# Patient Record
Sex: FEMALE | Race: WHITE | NOT HISPANIC OR LATINO | Employment: OTHER | URBAN - METROPOLITAN AREA
[De-identification: names, ages, dates, MRNs, and addresses within clinical notes are randomized per-mention and may not be internally consistent; named-entity substitution may affect disease eponyms.]

---

## 2023-05-17 ENCOUNTER — OFFICE VISIT (OUTPATIENT)
Dept: PRIMARY CARE | Facility: CLINIC | Age: 88
End: 2023-05-17
Payer: COMMERCIAL

## 2023-05-17 VITALS — DIASTOLIC BLOOD PRESSURE: 76 MMHG | SYSTOLIC BLOOD PRESSURE: 125 MMHG

## 2023-05-17 DIAGNOSIS — L57.0 ACTINIC KERATOSIS OF LEFT CHEEK: ICD-10-CM

## 2023-05-17 DIAGNOSIS — M25.551 PAIN OF RIGHT HIP: Primary | ICD-10-CM

## 2023-05-17 PROCEDURE — 99214 OFFICE O/P EST MOD 30 MIN: CPT | Performed by: FAMILY MEDICINE

## 2023-05-17 PROCEDURE — 17000 DESTRUCT PREMALG LESION: CPT | Performed by: FAMILY MEDICINE

## 2023-05-17 RX ORDER — GABAPENTIN 300 MG/1
300 CAPSULE ORAL NIGHTLY
Qty: 30 CAPSULE | Refills: 2 | Status: SHIPPED | OUTPATIENT
Start: 2023-05-17 | End: 2023-08-08 | Stop reason: SDUPTHER

## 2023-05-17 NOTE — PROGRESS NOTES
Subjective   Patient ID: Alisha Hodge is a 88 y.o. female who presents for left face skin lesion and rt hip pain    HPI   Patient noticed a brownish scaly skin rash left face a few months ago. No bleeding or oozing.  The area became sensitive to touch lately. No similar rashes elsewhere. No local gland enlargement. Rt hip pain persisted and was not controlled tylenol or neurontin 100-200mg at bedtime. Walking made the pain worse  Review of Systems    Objective   /76     Physical Exam  NAD, well groomed, Eyes: No sclera icterus. Neck: supple, no cervical lymphadenopathy, lungs: CTA b/l, heart: RRR, abd: soft, no tenderness, BS+, Good balance. There was a 4x4mm brownish scaly round rash at left face. No tenderness or bleeding.   Mild tenderness at rt hip with decreased rom. Good balance  Assessment/Plan     Left face Actinic Keratosis  which is precancerous.  Recommend liquid N2 freezing. Caution pt that burning sensation and even blisters can occur at the treated area. Pt understood that skin discoloration T the area of freezing can occur. Pt agreed to liquid N2 freezing treatment. The area of AK was frozen with liquid N2 (freeze for 30 seconds). We will monitor the lesion. Recommend sunscreen application when being under the sun  Rt hip pain, pt declined xr. tylenol did not help the pain. The pain was  partially controlled with neuronin 100-200mg at bedtime. Increase dose to 300mg at bedtime.

## 2023-08-08 ENCOUNTER — OFFICE VISIT (OUTPATIENT)
Dept: PRIMARY CARE | Facility: CLINIC | Age: 88
End: 2023-08-08
Payer: COMMERCIAL

## 2023-08-08 VITALS
HEIGHT: 63 IN | SYSTOLIC BLOOD PRESSURE: 124 MMHG | RESPIRATION RATE: 14 BRPM | WEIGHT: 151 LBS | DIASTOLIC BLOOD PRESSURE: 68 MMHG | BODY MASS INDEX: 26.75 KG/M2 | HEART RATE: 68 BPM

## 2023-08-08 DIAGNOSIS — G89.29 CHRONIC MIDLINE LOW BACK PAIN WITHOUT SCIATICA: ICD-10-CM

## 2023-08-08 DIAGNOSIS — M25.551 PAIN OF RIGHT HIP: Primary | ICD-10-CM

## 2023-08-08 DIAGNOSIS — M54.50 CHRONIC MIDLINE LOW BACK PAIN WITHOUT SCIATICA: ICD-10-CM

## 2023-08-08 DIAGNOSIS — L57.0 ACTINIC KERATOSIS OF LEFT CHEEK: ICD-10-CM

## 2023-08-08 DIAGNOSIS — Z00.00 ROUTINE MEDICAL EXAM: ICD-10-CM

## 2023-08-08 PROBLEM — M54.9 BACK PAIN: Status: ACTIVE | Noted: 2023-08-08

## 2023-08-08 PROBLEM — R13.10 DYSPHAGIA: Status: ACTIVE | Noted: 2023-08-08

## 2023-08-08 PROBLEM — K22.2 ESOPHAGEAL STRICTURE: Status: ACTIVE | Noted: 2023-08-08

## 2023-08-08 PROCEDURE — 1159F MED LIST DOCD IN RCRD: CPT | Performed by: FAMILY MEDICINE

## 2023-08-08 PROCEDURE — 90677 PCV20 VACCINE IM: CPT | Performed by: FAMILY MEDICINE

## 2023-08-08 PROCEDURE — 99214 OFFICE O/P EST MOD 30 MIN: CPT | Performed by: FAMILY MEDICINE

## 2023-08-08 PROCEDURE — G0439 PPPS, SUBSEQ VISIT: HCPCS | Performed by: FAMILY MEDICINE

## 2023-08-08 PROCEDURE — 1160F RVW MEDS BY RX/DR IN RCRD: CPT | Performed by: FAMILY MEDICINE

## 2023-08-08 PROCEDURE — 1036F TOBACCO NON-USER: CPT | Performed by: FAMILY MEDICINE

## 2023-08-08 PROCEDURE — G0009 ADMIN PNEUMOCOCCAL VACCINE: HCPCS | Performed by: FAMILY MEDICINE

## 2023-08-08 PROCEDURE — 1170F FXNL STATUS ASSESSED: CPT | Performed by: FAMILY MEDICINE

## 2023-08-08 PROCEDURE — 17000 DESTRUCT PREMALG LESION: CPT | Performed by: FAMILY MEDICINE

## 2023-08-08 RX ORDER — LATANOPROST 50 UG/ML
SOLUTION/ DROPS OPHTHALMIC
COMMUNITY

## 2023-08-08 RX ORDER — GABAPENTIN 300 MG/1
300 CAPSULE ORAL NIGHTLY
Qty: 90 CAPSULE | Refills: 0 | Status: SHIPPED | OUTPATIENT
Start: 2023-08-08

## 2023-08-08 ASSESSMENT — ACTIVITIES OF DAILY LIVING (ADL)
BATHING: INDEPENDENT
TAKING_MEDICATION: INDEPENDENT
DRESSING: INDEPENDENT
DOING_HOUSEWORK: INDEPENDENT
GROCERY_SHOPPING: INDEPENDENT
MANAGING_FINANCES: INDEPENDENT

## 2023-08-08 ASSESSMENT — PATIENT HEALTH QUESTIONNAIRE - PHQ9
SUM OF ALL RESPONSES TO PHQ9 QUESTIONS 1 AND 2: 0
1. LITTLE INTEREST OR PLEASURE IN DOING THINGS: NOT AT ALL
2. FEELING DOWN, DEPRESSED OR HOPELESS: NOT AT ALL

## 2023-08-08 NOTE — PROGRESS NOTES
"Subjective   Patient ID: Alisha Hodge is a 88 y.o. female who presents for Medicare Annual Wellness Visit Subsequent And fu  HPI   Patient noticed a brownish scaly skin rash on left upper face  a few months ago. The area was about 4mm in size. No bleeding or oozing.  The area became sensitive to touch. No similar rashes elsewhere. No local gland enlargement. Chronic low back and rt hip dull pain was at 74-5/10. the pain was localized to low back and rt hip. pt denies LE weakness or numbness. pt controlled BM and bladder well. Neurontin helped pain relief partially. no imbalance or falls.     Review of Systems    Objective   /68   Pulse 68   Resp 14   Ht 1.6 m (5' 3\")   Wt 68.5 kg (151 lb)   BMI 26.75 kg/m²     Physical Exam  No distress, well groomed, No sclera icterus. No cervical or axillary lymphadenopathy. normal respiration, lungs: CTA b/l, heart: RRR, no LE  edema, normal pedal pulses, abd: soft, no tenderness, BS+, mild low back and rt hip tenderness, DTR were normal at the knees, SLR test was negative, fair balance, There was a 5x4mm brownish scaly round rash at left upper face. No tenderness or bleeding. No depressed mood.    Assessment/Plan     Medicare wellness visit: pt was capable of performing all his ADLs and IADLs. Pt has good memory and cognitive function. pt is overweight. Recommend healthy diet and regular exercise. pt declined to see a nutritionist for eval. Advise eye exam by an OD yearly for glaucoma screen and dental exam every 6 months. check lipids.   will monitor blood pressure, cholesterol levels and weight regularly. Recommend sunscreen application if exposed to the sun when the UV index is over 2. Recommend shingle vaccines, tetanus vaccine, pcv20 and covid shot.   recommend cologuard, HIV, hep C tests, Recommend pt to clear throw rugs at home and keep good lighting at night to avoid falls. Keep hot water for shower below 120F to avoid burn injury.  recommend grab bar at " bathtub.   recommend to update living will and DPOA  stop smoking cigarettes. dc drinking  eoth.  pt stated that he had been taking all current  medications as prescribed.   Actinic Keratosis at left upper face which is precancerous.  Recommend liquid N2 freezing. Caution pt that burning sensation and even blisters can occur at the treated area. Pt understood that skin discoloration T the area of freezing can occur. Pt agreed to liquid N2 freezing treatment. The area of AK was frozen with liquid N2 (freeze for 30 seconds). We will monitor the lesion. Recommend sunscreen application when being under the sun  Pain at rt hip and low back. Pt declined to have PT, ortho or xr for eval. Neurontin as dir. Stretch exercise.

## 2023-09-13 ENCOUNTER — LAB (OUTPATIENT)
Dept: LAB | Facility: LAB | Age: 88
End: 2023-09-13
Payer: COMMERCIAL

## 2023-09-13 DIAGNOSIS — G89.29 CHRONIC MIDLINE LOW BACK PAIN WITHOUT SCIATICA: ICD-10-CM

## 2023-09-13 DIAGNOSIS — M25.551 PAIN OF RIGHT HIP: ICD-10-CM

## 2023-09-13 DIAGNOSIS — M54.50 CHRONIC MIDLINE LOW BACK PAIN WITHOUT SCIATICA: ICD-10-CM

## 2023-09-13 DIAGNOSIS — Z00.00 ROUTINE MEDICAL EXAM: ICD-10-CM

## 2023-09-13 LAB
ALANINE AMINOTRANSFERASE (SGPT) (U/L) IN SER/PLAS: 14 U/L (ref 7–45)
ALBUMIN (G/DL) IN SER/PLAS: 3.9 G/DL (ref 3.4–5)
ALKALINE PHOSPHATASE (U/L) IN SER/PLAS: 57 U/L (ref 33–136)
ANION GAP IN SER/PLAS: 13 MMOL/L (ref 10–20)
ASPARTATE AMINOTRANSFERASE (SGOT) (U/L) IN SER/PLAS: 19 U/L (ref 9–39)
BILIRUBIN TOTAL (MG/DL) IN SER/PLAS: 0.6 MG/DL (ref 0–1.2)
CALCIUM (MG/DL) IN SER/PLAS: 9.1 MG/DL (ref 8.6–10.3)
CARBON DIOXIDE, TOTAL (MMOL/L) IN SER/PLAS: 29 MMOL/L (ref 21–32)
CHLORIDE (MMOL/L) IN SER/PLAS: 102 MMOL/L (ref 98–107)
CREATININE (MG/DL) IN SER/PLAS: 1.22 MG/DL (ref 0.5–1.05)
ERYTHROCYTE DISTRIBUTION WIDTH (RATIO) BY AUTOMATED COUNT: 12.9 % (ref 11.5–14.5)
ERYTHROCYTE MEAN CORPUSCULAR HEMOGLOBIN CONCENTRATION (G/DL) BY AUTOMATED: 31.9 G/DL (ref 32–36)
ERYTHROCYTE MEAN CORPUSCULAR VOLUME (FL) BY AUTOMATED COUNT: 103 FL (ref 80–100)
ERYTHROCYTES (10*6/UL) IN BLOOD BY AUTOMATED COUNT: 4.42 X10E12/L (ref 4–5.2)
GFR FEMALE: 43 ML/MIN/1.73M2
GLUCOSE (MG/DL) IN SER/PLAS: 130 MG/DL (ref 74–99)
HEMATOCRIT (%) IN BLOOD BY AUTOMATED COUNT: 45.4 % (ref 36–46)
HEMOGLOBIN (G/DL) IN BLOOD: 14.5 G/DL (ref 12–16)
LEUKOCYTES (10*3/UL) IN BLOOD BY AUTOMATED COUNT: 6.3 X10E9/L (ref 4.4–11.3)
PLATELETS (10*3/UL) IN BLOOD AUTOMATED COUNT: 262 X10E9/L (ref 150–450)
POTASSIUM (MMOL/L) IN SER/PLAS: 4.7 MMOL/L (ref 3.5–5.3)
PROTEIN TOTAL: 6.8 G/DL (ref 6.4–8.2)
SODIUM (MMOL/L) IN SER/PLAS: 139 MMOL/L (ref 136–145)
UREA NITROGEN (MG/DL) IN SER/PLAS: 18 MG/DL (ref 6–23)

## 2023-09-13 PROCEDURE — 80053 COMPREHEN METABOLIC PANEL: CPT

## 2023-09-13 PROCEDURE — 36415 COLL VENOUS BLD VENIPUNCTURE: CPT

## 2023-09-13 PROCEDURE — 85027 COMPLETE CBC AUTOMATED: CPT

## 2023-10-27 ENCOUNTER — OFFICE VISIT (OUTPATIENT)
Dept: PRIMARY CARE | Facility: CLINIC | Age: 88
End: 2023-10-27
Payer: COMMERCIAL

## 2023-10-27 VITALS — HEART RATE: 76 BPM | DIASTOLIC BLOOD PRESSURE: 76 MMHG | RESPIRATION RATE: 14 BRPM | SYSTOLIC BLOOD PRESSURE: 123 MMHG

## 2023-10-27 DIAGNOSIS — J01.00 ACUTE NON-RECURRENT MAXILLARY SINUSITIS: Primary | ICD-10-CM

## 2023-10-27 PROCEDURE — 1036F TOBACCO NON-USER: CPT | Performed by: FAMILY MEDICINE

## 2023-10-27 PROCEDURE — 1160F RVW MEDS BY RX/DR IN RCRD: CPT | Performed by: FAMILY MEDICINE

## 2023-10-27 PROCEDURE — 99213 OFFICE O/P EST LOW 20 MIN: CPT | Performed by: FAMILY MEDICINE

## 2023-10-27 PROCEDURE — 1159F MED LIST DOCD IN RCRD: CPT | Performed by: FAMILY MEDICINE

## 2023-10-27 RX ORDER — AZITHROMYCIN 250 MG/1
TABLET, FILM COATED ORAL
Qty: 6 TABLET | Refills: 0 | Status: SHIPPED | OUTPATIENT
Start: 2023-10-27 | End: 2023-10-27 | Stop reason: ALTCHOICE

## 2023-10-27 RX ORDER — AZITHROMYCIN 250 MG/1
TABLET, FILM COATED ORAL
Qty: 6 TABLET | Refills: 0 | Status: SHIPPED | OUTPATIENT
Start: 2023-10-27 | End: 2023-10-31

## 2023-10-27 NOTE — PROGRESS NOTES
Subjective   Patient ID: Alisha Hodge is a 88 y.o. female who presents for sinu for 3 days    HPI   Patient started to have sinus congestion, facial ache, yellowish nasal discharge and mild dull headache 3 days ago. Patient also had cough with yellow phlegm. Normal hearing. No body aches, loss of smell/tastes,  fever, chills. Patient denies having shortness of breath, wheezing, chest pain, heart palpitation. No  nausea, vomiting or abdominal pain. Patient had normal appetite. No stiff neck, dizziness or imbalance. Symptoms persisted today.         Review of Systems    Objective   /76   Pulse 76   Resp 14     Physical Exam  no distress. Eyes: EMOI, No conjunctival erythema. Left  maxillary  area tenderness upon palpation. There was yellowish nasal discharge. Mildly erythematous pharynx. No cervical lymph node tenderness or enlargement. Neck is supple. Lungs: CTA b/l, Heart: RRR. Abdomen: soft, no tenderness. Bowel sound: normal. Patient walks with a good balance.      Assessment/Plan        Acute sinusitis: Antibiotics as above. Saline nasal spray. Tylenol prn for fever or ache. Increase fluid intake. If symptoms do not improve in 3-4  days, call office.

## 2023-10-30 ENCOUNTER — TELEPHONE (OUTPATIENT)
Dept: PRIMARY CARE | Facility: CLINIC | Age: 88
End: 2023-10-30
Payer: COMMERCIAL

## 2023-10-30 DIAGNOSIS — J01.00 ACUTE NON-RECURRENT MAXILLARY SINUSITIS: Primary | ICD-10-CM

## 2023-10-30 RX ORDER — AZITHROMYCIN 200 MG/5ML
500 POWDER, FOR SUSPENSION ORAL DAILY
Qty: 37.5 ML | Refills: 0 | Status: SHIPPED | OUTPATIENT
Start: 2023-10-30 | End: 2023-11-02

## 2023-10-30 NOTE — TELEPHONE ENCOUNTER
Patient had Zithromax called in for her on 10/27 and she is unable to swallow pills can we change this to liquid for her. [facility] in [place].

## 2023-11-08 ENCOUNTER — APPOINTMENT (OUTPATIENT)
Dept: PRIMARY CARE | Facility: CLINIC | Age: 88
End: 2023-11-08
Payer: COMMERCIAL

## 2025-01-29 ENCOUNTER — TELEPHONE (OUTPATIENT)
Dept: PRIMARY CARE | Facility: CLINIC | Age: 89
End: 2025-01-29
Payer: COMMERCIAL

## 2025-01-29 DIAGNOSIS — M25.551 PAIN OF RIGHT HIP: ICD-10-CM

## 2025-01-29 RX ORDER — GABAPENTIN 300 MG/1
300 CAPSULE ORAL NIGHTLY
Qty: 30 CAPSULE | Refills: 0 | Status: SHIPPED | OUTPATIENT
Start: 2025-01-29 | End: 2025-02-04 | Stop reason: SDUPTHER

## 2025-01-29 NOTE — TELEPHONE ENCOUNTER
Pt would like to know if you will call in enough  gabapentin (Neurontin) 300 mg to get her to her appt on 2/4?  [place]

## 2025-02-04 ENCOUNTER — TELEPHONE (OUTPATIENT)
Dept: PRIMARY CARE | Facility: CLINIC | Age: 89
End: 2025-02-04

## 2025-02-04 ENCOUNTER — APPOINTMENT (OUTPATIENT)
Dept: PRIMARY CARE | Facility: CLINIC | Age: 89
End: 2025-02-04
Payer: COMMERCIAL

## 2025-02-04 VITALS
SYSTOLIC BLOOD PRESSURE: 118 MMHG | HEIGHT: 64 IN | BODY MASS INDEX: 25.54 KG/M2 | WEIGHT: 149.6 LBS | DIASTOLIC BLOOD PRESSURE: 56 MMHG

## 2025-02-04 DIAGNOSIS — R71.8 ELEVATED MCV: ICD-10-CM

## 2025-02-04 DIAGNOSIS — R79.89 ELEVATED SERUM CREATININE: Primary | ICD-10-CM

## 2025-02-04 DIAGNOSIS — M25.551 PAIN OF RIGHT HIP: ICD-10-CM

## 2025-02-04 DIAGNOSIS — Z00.00 ROUTINE MEDICAL EXAM: ICD-10-CM

## 2025-02-04 PROBLEM — M54.9 BACK PAIN: Status: RESOLVED | Noted: 2023-08-08 | Resolved: 2025-02-04

## 2025-02-04 PROCEDURE — 1036F TOBACCO NON-USER: CPT | Performed by: FAMILY MEDICINE

## 2025-02-04 PROCEDURE — 1170F FXNL STATUS ASSESSED: CPT | Performed by: FAMILY MEDICINE

## 2025-02-04 PROCEDURE — G2211 COMPLEX E/M VISIT ADD ON: HCPCS | Performed by: FAMILY MEDICINE

## 2025-02-04 PROCEDURE — 1159F MED LIST DOCD IN RCRD: CPT | Performed by: FAMILY MEDICINE

## 2025-02-04 PROCEDURE — 99214 OFFICE O/P EST MOD 30 MIN: CPT | Performed by: FAMILY MEDICINE

## 2025-02-04 PROCEDURE — G0439 PPPS, SUBSEQ VISIT: HCPCS | Performed by: FAMILY MEDICINE

## 2025-02-04 RX ORDER — GABAPENTIN 300 MG/1
300 CAPSULE ORAL NIGHTLY
Qty: 30 CAPSULE | Refills: 2 | Status: SHIPPED | OUTPATIENT
Start: 2025-02-04 | End: 2025-02-04 | Stop reason: ALTCHOICE

## 2025-02-04 RX ORDER — GABAPENTIN 100 MG/1
100 CAPSULE ORAL NIGHTLY
Qty: 90 CAPSULE | Refills: 1 | Status: SHIPPED | OUTPATIENT
Start: 2025-02-04

## 2025-02-04 RX ORDER — GABAPENTIN 100 MG/1
300 CAPSULE ORAL NIGHTLY
Qty: 90 CAPSULE | Refills: 0 | Status: SHIPPED | OUTPATIENT
Start: 2025-02-04 | End: 2025-02-04 | Stop reason: SDUPTHER

## 2025-02-04 ASSESSMENT — ACTIVITIES OF DAILY LIVING (ADL)
GROCERY_SHOPPING: INDEPENDENT
MANAGING_FINANCES: INDEPENDENT
TAKING_MEDICATION: INDEPENDENT
BATHING: INDEPENDENT
DRESSING: INDEPENDENT
DOING_HOUSEWORK: INDEPENDENT

## 2025-02-04 ASSESSMENT — PATIENT HEALTH QUESTIONNAIRE - PHQ9
1. LITTLE INTEREST OR PLEASURE IN DOING THINGS: NOT AT ALL
2. FEELING DOWN, DEPRESSED OR HOPELESS: NOT AT ALL
SUM OF ALL RESPONSES TO PHQ9 QUESTIONS 1 AND 2: 0

## 2025-02-04 NOTE — PROGRESS NOTES
"Subjective   Patient ID: Alisha Hodge is a 89 y.o. female who presents for Medicare Annual Wellness Visit Subsequent (fu) and Rt hip pain.    HPI   Cr and mcv were elevated. Pt denies eoth or otc meds use. No leg swelling. No fatigue,  No vision change, cp, heart palpitation. No HA, dizziness, dry skin or constipation. No wt changes. Pt sleeps well. No depressed mood. Rt hip pain was controlled with Neurontin. No imbalance or falls.   Review of Systems    Objective   /56   Ht 1.626 m (5' 4\")   Wt 67.9 kg (149 lb 9.6 oz)   BMI 25.68 kg/m²     Physical Exam  No distress, well groomed, No sclera icterus. normal respiration, lungs: CTA b/l, heart: RRR, no LE  edema, abd: soft, no tenderness, BS+, no  hip tenderness, normal strength and sensation at low extremities, good balance, No depressed mood.    Assessment/Plan   Assessment & Plan  Pain of right hip  Controlled. Decrease dose to minimize se. Will monitor se. Fu in 6 mos  Orders:    gabapentin (Neurontin) 100 mg capsule; Take 1 capsule (100 mg) by mouth once daily at bedtime.    Elevated serum creatinine  No  lower extremity edema. Avoid NSAIDs, pt declined labs test         Elevated MCV  Pt denies eoth drinking. pt declined labs test. Recommend b12 and folate supplement       Routine medical exam [Z00.00]  Medicare wellness visit: pt was capable of performing all ADLs and IADLs. Pt has some mild memory impairment but no decline in cognitive function. Pt is overweight. Recommend healthy diet and regular exercise. pt declined to see a nutritionist for eval. Advise eye exam by an OD yearly for glaucoma screen and dental exam every 6 months.   will monitor blood pressure, cholesterol levels and weight regularly. Recommend, flu, pcv20, and covid shot, pt declined all.  Recommend  DEXA.  pt declined  Keep hot water for shower below 120F to avoid burn injury.  Pt has grab bar in bathtub.   Recommend to update living will and DPOA  pt has been taking all " medications as prescribed.

## 2025-02-04 NOTE — TELEPHONE ENCOUNTER
Pharmacy stated that pt had refill of gabapentin (Neurontin) 300 mg capsule //Take 1 capsule (300 mg) by mouth once daily at bedtime.on 1/29 should they reverse it or fill for the with new 100 mg?

## 2025-02-04 NOTE — ASSESSMENT & PLAN NOTE
Controlled. Decrease dose to minimize se. Will monitor se. Fu in 6 mos  Orders:    gabapentin (Neurontin) 100 mg capsule; Take 1 capsule (100 mg) by mouth once daily at bedtime.

## 2025-06-17 ENCOUNTER — TELEPHONE (OUTPATIENT)
Dept: PRIMARY CARE | Facility: CLINIC | Age: 89
End: 2025-06-17
Payer: COMMERCIAL

## 2025-06-17 DIAGNOSIS — M25.551 PAIN OF RIGHT HIP: ICD-10-CM

## 2025-06-17 RX ORDER — GABAPENTIN 100 MG/1
100 CAPSULE ORAL NIGHTLY
Qty: 90 CAPSULE | Refills: 1 | Status: SHIPPED | OUTPATIENT
Start: 2025-06-17

## 2025-06-17 NOTE — TELEPHONE ENCOUNTER
gabapentin (Neurontin) 100 mg capsule//Take 1 capsule (100 mg) by mouth once daily at bedtime.  [facility]

## 2025-08-04 ENCOUNTER — APPOINTMENT (OUTPATIENT)
Dept: PRIMARY CARE | Facility: CLINIC | Age: 89
End: 2025-08-04
Payer: COMMERCIAL